# Patient Record
Sex: MALE | Race: WHITE | NOT HISPANIC OR LATINO | URBAN - METROPOLITAN AREA
[De-identification: names, ages, dates, MRNs, and addresses within clinical notes are randomized per-mention and may not be internally consistent; named-entity substitution may affect disease eponyms.]

---

## 2024-02-26 ENCOUNTER — EMERGENCY (EMERGENCY)
Facility: HOSPITAL | Age: 49
LOS: 1 days | Discharge: HOME | End: 2024-02-26
Attending: EMERGENCY MEDICINE
Payer: COMMERCIAL

## 2024-02-26 VITALS
HEART RATE: 105 BPM | TEMPERATURE: 98 F | SYSTOLIC BLOOD PRESSURE: 165 MMHG | OXYGEN SATURATION: 96 % | WEIGHT: 275.58 LBS | DIASTOLIC BLOOD PRESSURE: 103 MMHG | RESPIRATION RATE: 18 BRPM

## 2024-02-26 LAB
ALBUMIN SERPL ELPH-MCNC: 3.9 G/DL — SIGNIFICANT CHANGE UP (ref 3.5–5)
ALP SERPL-CCNC: 69 U/L — SIGNIFICANT CHANGE UP (ref 40–120)
ALT FLD-CCNC: 27 U/L DA — SIGNIFICANT CHANGE UP (ref 10–60)
ANION GAP SERPL CALC-SCNC: 5 MMOL/L — SIGNIFICANT CHANGE UP (ref 5–17)
APPEARANCE UR: CLEAR — SIGNIFICANT CHANGE UP
AST SERPL-CCNC: 14 U/L — SIGNIFICANT CHANGE UP (ref 10–40)
BACTERIA # UR AUTO: NEGATIVE /HPF — SIGNIFICANT CHANGE UP
BASOPHILS # BLD AUTO: 0.04 K/UL — SIGNIFICANT CHANGE UP (ref 0–0.2)
BASOPHILS NFR BLD AUTO: 0.4 % — SIGNIFICANT CHANGE UP (ref 0–2)
BILIRUB SERPL-MCNC: 0.3 MG/DL — SIGNIFICANT CHANGE UP (ref 0.2–1.2)
BILIRUB UR-MCNC: NEGATIVE — SIGNIFICANT CHANGE UP
BUN SERPL-MCNC: 15 MG/DL — SIGNIFICANT CHANGE UP (ref 7–18)
CALCIUM SERPL-MCNC: 8.7 MG/DL — SIGNIFICANT CHANGE UP (ref 8.4–10.5)
CHLORIDE SERPL-SCNC: 104 MMOL/L — SIGNIFICANT CHANGE UP (ref 96–108)
CO2 SERPL-SCNC: 28 MMOL/L — SIGNIFICANT CHANGE UP (ref 22–31)
COLOR SPEC: YELLOW — SIGNIFICANT CHANGE UP
CREAT SERPL-MCNC: 1.15 MG/DL — SIGNIFICANT CHANGE UP (ref 0.5–1.3)
DIFF PNL FLD: ABNORMAL
EGFR: 78 ML/MIN/1.73M2 — SIGNIFICANT CHANGE UP
EOSINOPHIL # BLD AUTO: 0.15 K/UL — SIGNIFICANT CHANGE UP (ref 0–0.5)
EOSINOPHIL NFR BLD AUTO: 1.4 % — SIGNIFICANT CHANGE UP (ref 0–6)
EPI CELLS # UR: PRESENT
GLUCOSE SERPL-MCNC: 102 MG/DL — HIGH (ref 70–99)
GLUCOSE UR QL: NEGATIVE MG/DL — SIGNIFICANT CHANGE UP
HCT VFR BLD CALC: 44 % — SIGNIFICANT CHANGE UP (ref 39–50)
HGB BLD-MCNC: 14.5 G/DL — SIGNIFICANT CHANGE UP (ref 13–17)
IMM GRANULOCYTES NFR BLD AUTO: 0.2 % — SIGNIFICANT CHANGE UP (ref 0–0.9)
KETONES UR-MCNC: NEGATIVE MG/DL — SIGNIFICANT CHANGE UP
LEUKOCYTE ESTERASE UR-ACNC: NEGATIVE — SIGNIFICANT CHANGE UP
LIDOCAIN IGE QN: 48 U/L — SIGNIFICANT CHANGE UP (ref 13–75)
LYMPHOCYTES # BLD AUTO: 2.68 K/UL — SIGNIFICANT CHANGE UP (ref 1–3.3)
LYMPHOCYTES # BLD AUTO: 25.1 % — SIGNIFICANT CHANGE UP (ref 13–44)
MAGNESIUM SERPL-MCNC: 2 MG/DL — SIGNIFICANT CHANGE UP (ref 1.6–2.6)
MCHC RBC-ENTMCNC: 29.3 PG — SIGNIFICANT CHANGE UP (ref 27–34)
MCHC RBC-ENTMCNC: 33 GM/DL — SIGNIFICANT CHANGE UP (ref 32–36)
MCV RBC AUTO: 88.9 FL — SIGNIFICANT CHANGE UP (ref 80–100)
MONOCYTES # BLD AUTO: 1.06 K/UL — HIGH (ref 0–0.9)
MONOCYTES NFR BLD AUTO: 9.9 % — SIGNIFICANT CHANGE UP (ref 2–14)
NEUTROPHILS # BLD AUTO: 6.71 K/UL — SIGNIFICANT CHANGE UP (ref 1.8–7.4)
NEUTROPHILS NFR BLD AUTO: 63 % — SIGNIFICANT CHANGE UP (ref 43–77)
NITRITE UR-MCNC: NEGATIVE — SIGNIFICANT CHANGE UP
NRBC # BLD: 0 /100 WBCS — SIGNIFICANT CHANGE UP (ref 0–0)
PH UR: 5.5 — SIGNIFICANT CHANGE UP (ref 5–8)
PLATELET # BLD AUTO: 264 K/UL — SIGNIFICANT CHANGE UP (ref 150–400)
POTASSIUM SERPL-MCNC: 4.2 MMOL/L — SIGNIFICANT CHANGE UP (ref 3.5–5.3)
POTASSIUM SERPL-SCNC: 4.2 MMOL/L — SIGNIFICANT CHANGE UP (ref 3.5–5.3)
PROT SERPL-MCNC: 8.2 G/DL — SIGNIFICANT CHANGE UP (ref 6–8.3)
PROT UR-MCNC: NEGATIVE MG/DL — SIGNIFICANT CHANGE UP
RBC # BLD: 4.95 M/UL — SIGNIFICANT CHANGE UP (ref 4.2–5.8)
RBC # FLD: 12.1 % — SIGNIFICANT CHANGE UP (ref 10.3–14.5)
RBC CASTS # UR COMP ASSIST: 5 /HPF — HIGH (ref 0–4)
SODIUM SERPL-SCNC: 137 MMOL/L — SIGNIFICANT CHANGE UP (ref 135–145)
SP GR SPEC: 1.01 — SIGNIFICANT CHANGE UP (ref 1–1.03)
UROBILINOGEN FLD QL: 0.2 MG/DL — SIGNIFICANT CHANGE UP (ref 0.2–1)
WBC # BLD: 10.66 K/UL — HIGH (ref 3.8–10.5)
WBC # FLD AUTO: 10.66 K/UL — HIGH (ref 3.8–10.5)
WBC UR QL: 1 /HPF — SIGNIFICANT CHANGE UP (ref 0–5)

## 2024-02-26 PROCEDURE — 83735 ASSAY OF MAGNESIUM: CPT

## 2024-02-26 PROCEDURE — 80053 COMPREHEN METABOLIC PANEL: CPT

## 2024-02-26 PROCEDURE — 36415 COLL VENOUS BLD VENIPUNCTURE: CPT

## 2024-02-26 PROCEDURE — 83690 ASSAY OF LIPASE: CPT

## 2024-02-26 PROCEDURE — 87086 URINE CULTURE/COLONY COUNT: CPT

## 2024-02-26 PROCEDURE — 81001 URINALYSIS AUTO W/SCOPE: CPT

## 2024-02-26 PROCEDURE — 99284 EMERGENCY DEPT VISIT MOD MDM: CPT | Mod: 25

## 2024-02-26 PROCEDURE — 99284 EMERGENCY DEPT VISIT MOD MDM: CPT

## 2024-02-26 PROCEDURE — 74176 CT ABD & PELVIS W/O CONTRAST: CPT | Mod: MC

## 2024-02-26 PROCEDURE — 85025 COMPLETE CBC W/AUTO DIFF WBC: CPT

## 2024-02-26 PROCEDURE — 96361 HYDRATE IV INFUSION ADD-ON: CPT

## 2024-02-26 PROCEDURE — 96374 THER/PROPH/DIAG INJ IV PUSH: CPT

## 2024-02-26 PROCEDURE — 74176 CT ABD & PELVIS W/O CONTRAST: CPT | Mod: 26,MC

## 2024-02-26 RX ORDER — KETOROLAC TROMETHAMINE 30 MG/ML
30 SYRINGE (ML) INJECTION ONCE
Refills: 0 | Status: DISCONTINUED | OUTPATIENT
Start: 2024-02-26 | End: 2024-02-26

## 2024-02-26 RX ORDER — SODIUM CHLORIDE 9 MG/ML
1000 INJECTION INTRAMUSCULAR; INTRAVENOUS; SUBCUTANEOUS
Refills: 0 | Status: DISCONTINUED | OUTPATIENT
Start: 2024-02-26 | End: 2024-03-01

## 2024-02-26 RX ORDER — SODIUM CHLORIDE 9 MG/ML
1000 INJECTION INTRAMUSCULAR; INTRAVENOUS; SUBCUTANEOUS ONCE
Refills: 0 | Status: COMPLETED | OUTPATIENT
Start: 2024-02-26 | End: 2024-02-26

## 2024-02-26 RX ADMIN — Medication 30 MILLIGRAM(S): at 22:53

## 2024-02-26 RX ADMIN — Medication 30 MILLIGRAM(S): at 22:50

## 2024-02-26 RX ADMIN — SODIUM CHLORIDE 1000 MILLILITER(S): 9 INJECTION INTRAMUSCULAR; INTRAVENOUS; SUBCUTANEOUS at 20:49

## 2024-02-26 RX ADMIN — SODIUM CHLORIDE 150 MILLILITER(S): 9 INJECTION INTRAMUSCULAR; INTRAVENOUS; SUBCUTANEOUS at 22:49

## 2024-02-27 VITALS
SYSTOLIC BLOOD PRESSURE: 158 MMHG | HEART RATE: 71 BPM | OXYGEN SATURATION: 100 % | DIASTOLIC BLOOD PRESSURE: 95 MMHG | RESPIRATION RATE: 18 BRPM

## 2024-02-27 RX ORDER — IBUPROFEN 200 MG
1 TABLET ORAL
Qty: 21 | Refills: 0
Start: 2024-02-27 | End: 2024-03-04

## 2024-02-27 RX ORDER — TAMSULOSIN HYDROCHLORIDE 0.4 MG/1
1 CAPSULE ORAL
Qty: 14 | Refills: 0
Start: 2024-02-27 | End: 2024-03-11

## 2024-02-27 RX ADMIN — SODIUM CHLORIDE 1000 MILLILITER(S): 9 INJECTION INTRAMUSCULAR; INTRAVENOUS; SUBCUTANEOUS at 00:23

## 2024-02-27 NOTE — ED PROVIDER NOTE - CLINICAL SUMMARY MEDICAL DECISION MAKING FREE TEXT BOX
Patient with history of renal colic.  Now with sudden onset of left flank pain radiates to his left lower quadrant, concern for acute renal colic, unlikely patient with pyelonephritis or diverticulitis.  Will get CT, labs, give IV fluids and Toradol and reassess

## 2024-02-27 NOTE — ED PROVIDER NOTE - OBJECTIVE STATEMENT
48-year-old male with history of renal colic, cholecystectomy, complaining on and off left sided flank pain radiates to his left lower quadrant pain today.  Patient denies N/V, dysuria, hematuria, fever.  Pain is worse with movement.  He took nothing for his pain.  Last BM was today while having his pain

## 2024-02-27 NOTE — ED PROVIDER NOTE - NSFOLLOWUPINSTRUCTIONS_ED_ALL_ED_FT
Renal Colic  The urinary tract with a close-up of a kidney with kidney stones.  Renal colic is pain that is caused by passing a kidney stone. The pain can be sharp and severe. It may be felt in your back, abdomen, side (flank), or groin. It can cause nausea. Renal colic can come and go.    Follow these instructions at home:  Watch your condition for any changes.    Medicines    Take over-the-counter and prescription medicines only as told by your health care provider.  Ask your provider if the medicine prescribed to you:  Requires you to avoid driving or using machinery.  Can cause constipation. You may need to take these actions to prevent or treat constipation:  Take over-the-counter or prescription medicines.  Eat foods that are high in fiber, such as beans, whole grains, and fresh fruits and vegetables.  Limit foods that are high in fat and processed sugars, such as fried or sweet foods.  Eating and drinking    Drink enough fluid to keep your pee (urine) pale yellow. You may be told to drink at least 8–10 glasses of water each day.  Follow instructions from your provider about what you may eat and drink.  If told, change your diet. You may need to:  Limit how much salt (sodium) you eat. You may need to eat less than 2 grams (2,000 mg) per day.  Eat more fruits and vegetables.  Limit how much animal protein you eat. This includes red meat, fish, poultry, and eggs.  Avoid foods such as spinach, rhubarb, sweet potatoes, and nuts. These foods make kidney stones more likely to form.  General instructions    Collect pee samples as told by your provider. You may need to collect a pee sample after you pass the kidney stone.  Strain your pee every time you pee (urinate), for as long as you are told. Use the strainer that your provider gives you.  Do not throw out the kidney stone after you pass it. Keep the stone so it can be tested by your provider. Testing the makeup of your kidney stone may show why you got it and help prevent you from getting more in the future.  Your provider may give you more instructions. Make sure you know what you can and cannot do.    Contact a health care provider if:  You have a fever or chills.  Your pee smells bad or looks cloudy.  You have pain or burning when you pee.  You have blood in your pee.  Get help right away if:  The pain in your flank or groin suddenly gets worse.  You become confused or do not know the time of day, where you are, or who you are (become disoriented).  You feel like you may faint or you faint.  This information is not intended to replace advice given to you by your health care provider. Make sure you discuss any questions you have with your health care provider.      Use a strainer to catch the stone  Drink plenty of fluids  Take Flomax at nighttime  Take ibuprofen 3 times a day as needed with food  You will receive a phone call regarding urology referral  Have urology follow-up with your abnormal kidney finding on your right side

## 2024-02-27 NOTE — ED PROVIDER NOTE - PROGRESS NOTE DETAILS
Labs/CT explained to patient and wife  Patient is feeling much better, denies any abdominal pain.  Patient with left renal colic.  Also explained to patient that he has a possible right renal cyst.  Will give outpatient urology referral.  Will D/C home with Flomax and ibuprofen

## 2024-02-27 NOTE — ED PROVIDER NOTE - PATIENT PORTAL LINK FT
You can access the FollowMyHealth Patient Portal offered by [facility] by registering at the following website: [email or web address]. By joining [employer]’s FollowMyHealth portal, you will also be able to view your health information using other applications (apps) compatible with our system.

## 2024-02-28 LAB
CULTURE RESULTS: SIGNIFICANT CHANGE UP
SPECIMEN SOURCE: SIGNIFICANT CHANGE UP

## 2024-03-04 ENCOUNTER — APPOINTMENT (OUTPATIENT)
Dept: UROLOGY | Facility: CLINIC | Age: 49
End: 2024-03-04
Payer: COMMERCIAL

## 2024-03-04 ENCOUNTER — RESULT REVIEW (OUTPATIENT)
Age: 49
End: 2024-03-04

## 2024-03-04 VITALS
BODY MASS INDEX: 29.63 KG/M2 | HEART RATE: 81 BPM | OXYGEN SATURATION: 95 % | DIASTOLIC BLOOD PRESSURE: 104 MMHG | SYSTOLIC BLOOD PRESSURE: 165 MMHG | WEIGHT: 207 LBS | TEMPERATURE: 96.3 F | RESPIRATION RATE: 16 BRPM | HEIGHT: 70 IN

## 2024-03-04 DIAGNOSIS — N28.1 CYST OF KIDNEY, ACQUIRED: ICD-10-CM

## 2024-03-04 PROBLEM — Z00.00 ENCOUNTER FOR PREVENTIVE HEALTH EXAMINATION: Status: ACTIVE | Noted: 2024-03-04

## 2024-03-04 PROCEDURE — G2211 COMPLEX E/M VISIT ADD ON: CPT

## 2024-03-04 PROCEDURE — 99204 OFFICE O/P NEW MOD 45 MIN: CPT

## 2024-03-04 NOTE — HISTORY OF PRESENT ILLNESS
[FreeTextEntry1] : 48-year-old male seen in the ED February 28, 2024 with left flank pain found to have a 3 mm left UVJ stone.  He currently has no  symptoms. He was started on tamsulosin for MET and has passed his stone. He brought it in today  Also found on CT to have an indeterminate right renal cyst on non con CT.

## 2024-03-04 NOTE — PHYSICAL EXAM
[Normal Appearance] : normal appearance [Well Groomed] : well groomed [General Appearance - In No Acute Distress] : no acute distress [Edema] : no peripheral edema [Respiration, Rhythm And Depth] : normal respiratory rhythm and effort [Abdomen Soft] : soft [Exaggerated Use Of Accessory Muscles For Inspiration] : no accessory muscle use [Abdomen Tenderness] : non-tender [Costovertebral Angle Tenderness] : no ~M costovertebral angle tenderness [Normal Station and Gait] : the gait and station were normal for the patient's age [Urinary Bladder Findings] : the bladder was normal on palpation [No Focal Deficits] : no focal deficits [] : no rash [Oriented To Time, Place, And Person] : oriented to person, place, and time [Mood] : the mood was normal [Affect] : the affect was normal [No Palpable Adenopathy] : no palpable adenopathy

## 2024-03-07 LAB
KIDNEY STONE SOURCE: NORMAL
NIDUS STONE QN: NORMAL
RESULT COMMENT: NORMAL

## 2024-03-11 ENCOUNTER — APPOINTMENT (OUTPATIENT)
Dept: CT IMAGING | Facility: CLINIC | Age: 49
End: 2024-03-11
Payer: COMMERCIAL

## 2024-03-11 PROCEDURE — 74178 CT ABD&PLV WO CNTR FLWD CNTR: CPT

## 2024-04-01 ENCOUNTER — APPOINTMENT (OUTPATIENT)
Dept: UROLOGY | Facility: CLINIC | Age: 49
End: 2024-04-01
Payer: COMMERCIAL

## 2024-04-01 ENCOUNTER — TRANSCRIPTION ENCOUNTER (OUTPATIENT)
Age: 49
End: 2024-04-01

## 2024-04-01 VITALS
DIASTOLIC BLOOD PRESSURE: 116 MMHG | BODY MASS INDEX: 39.08 KG/M2 | HEART RATE: 91 BPM | TEMPERATURE: 98 F | HEIGHT: 70 IN | OXYGEN SATURATION: 98 % | WEIGHT: 273 LBS | SYSTOLIC BLOOD PRESSURE: 164 MMHG

## 2024-04-01 DIAGNOSIS — N20.0 CALCULUS OF KIDNEY: ICD-10-CM

## 2024-04-01 PROCEDURE — 99214 OFFICE O/P EST MOD 30 MIN: CPT

## 2024-04-01 PROCEDURE — G2211 COMPLEX E/M VISIT ADD ON: CPT

## 2024-04-01 NOTE — HISTORY OF PRESENT ILLNESS
[FreeTextEntry1] : Welsh  #[id number]  48-year-old male seen in the ED February 28, 2024 with left flank pain found to have a 3 mm left UVJ stone. His CT non con demonstrated a 2.4 cm indeterminate mass. He underwent repeat CT abd/pelvis with and without iv contrast and is here today to discuss the findings.  His CT was significant for 2.4 cm hypoenhancing mass with nonenhancing central scar.

## 2024-04-01 NOTE — ASSESSMENT
[FreeTextEntry1] : 48-year-old male with a 3 mm left UVJ stone which he has passed. Stone analysis discussed and reviewed dietary modifications for stone prevention  We also reviewed his CT abd/pelvis demonstrating a small renal mass. I discussed with him options including active surveillance vs partial nephrectomy. We discussed reimaging intervals to look for growth kinetics. He will repeat imaging in 4 months to better assess growth kinetics and renal mass characteristics.    Plan Reviewed stone analysis results: Calcium oxalate I have reviewed images of patient's ct abd/pelvis and discussed results with patient demonstrating a 2.4 cm hypoenhancing right renal mass that is most likely oncocytoma but unable to rule out RCC Renal US in 4 months to assess for stones as well as renal mass size Repeat CT abd/pelvis in 4 months to better assess renal mass growth kinetics Dietary modifications for stone prevention discused    Patient is being seen today for evaluation and management of a chronic and longitudinal ongoing condition and I am of the primary treating physician.

## 2024-04-01 NOTE — PHYSICAL EXAM
[Normal Appearance] : normal appearance [Well Groomed] : well groomed [Edema] : no peripheral edema [General Appearance - In No Acute Distress] : no acute distress [Abdomen Soft] : soft [Exaggerated Use Of Accessory Muscles For Inspiration] : no accessory muscle use [Respiration, Rhythm And Depth] : normal respiratory rhythm and effort [Costovertebral Angle Tenderness] : no ~M costovertebral angle tenderness [Abdomen Tenderness] : non-tender [Urinary Bladder Findings] : the bladder was normal on palpation [Normal Station and Gait] : the gait and station were normal for the patient's age [] : no rash [Oriented To Time, Place, And Person] : oriented to person, place, and time [No Focal Deficits] : no focal deficits [Affect] : the affect was normal [No Palpable Adenopathy] : no palpable adenopathy [Mood] : the mood was normal

## 2024-04-08 ENCOUNTER — APPOINTMENT (OUTPATIENT)
Dept: UROLOGY | Facility: CLINIC | Age: 49
End: 2024-04-08
Payer: COMMERCIAL

## 2024-04-08 VITALS
OXYGEN SATURATION: 96 % | SYSTOLIC BLOOD PRESSURE: 168 MMHG | TEMPERATURE: 98.1 F | HEART RATE: 87 BPM | BODY MASS INDEX: 39.08 KG/M2 | WEIGHT: 273 LBS | DIASTOLIC BLOOD PRESSURE: 103 MMHG | HEIGHT: 70 IN | RESPIRATION RATE: 16 BRPM

## 2024-04-08 DIAGNOSIS — N28.89 OTHER SPECIFIED DISORDERS OF KIDNEY AND URETER: ICD-10-CM

## 2024-04-08 PROCEDURE — 99215 OFFICE O/P EST HI 40 MIN: CPT

## 2024-04-08 NOTE — HISTORY OF PRESENT ILLNESS
[FreeTextEntry1] : AN REED Nov 30 1975   Language: English / Lao refused office  requested daughter-in law discuss.  Date of First visit: 04/08/2024 Accompanied by: self - daughter in law on telephone Marylin Contact info:  Dr. Rajput - second opinion primary care -[phone] Referring Provider/PCP: Dr. Darcie Draper Fax: [phone]   CC/ Problem List: Right renal mass =============================================================================== FIRST VISIT / Summary: Very pleasant 48 year old M here for discussion of renal mass found on CT. He is nervous about the mass and saw Dr. Rajput to discuss in his own language options. Had long discussion about options comparing and contrasting including Sestamibi PET surveillance vs conventional, biopsy, ablation, or surgery. He is quite nervous about the mass and is young.    When he checks BP at home is about 130/85.  ------------------------------------------------------------------------------------------- INTERVAL VISITS:   =============================================================================   PMH: HTN Meds: started a medicine with Dr. Rajput All: nkda FHx: No  malignancies. SocHx:    PSH: Cholecystectomy ~2012  ROS: Review of Systems is as per HPI unless otherwise denoted below  ============================================================================= DATA: LABS (SELECTED):---------------------------------------------------------------------------------------------------     RADS:------------------------------------------------------------------------------------------------------------------- 3/11/24: CT abd/pelvis with/without: right renal mass, significant peripheral enhancement from 57 to 177 HU. Central area hypoenhancing may represent scar or necrosis.    PATHOLOGY/CYTOLOGY:-------------------------------------------------------------------------------------------     VOIDING STUDIES: ----------------------------------------------------------------------------------------------------    STONE STUDIES: ([facility]/LLSA)---------------------------------------------------------------------------------     PROCEDURES: -----------------------------------------------------------------------------------------------       ============================================================================= PHYSICAL EXAM:    FOCUSED: ----------------------------------------------------------------------------------------------------------------     ======================================================================================= DISCUSSION: The patient and I talked about the nature, diagnosis and treatment of renal masses. We discussed that the majority are malignant (about 80%) and the remainder may be benign (e.g. oncocytoma, angiomyelolipoma). Small masses have a higher chance of being benign, and as the size increases or the growth rate increases, the risk of malignancy increases. The patient understands that percutaneous biopsies are not routinely used to differentiate between masses due to the risk of error or false negative result as some tumors can have benign and malignant components. We discussed 4 options for management:   1: Active Surveillance: this approach follows the kidney mass every 6-12 months initially and less often as time goes on. Use of ultrasound reduces radiation dose over time and is therefore used for follow-up after growth rate has been established. The benefit of this approach is it avoids any morbidity of the other options. Also Sestamibi scan may characterize a renal mass. His mass may have central scar however he is uninterested in surveillance.   2: Renal Mass Biopsy: As mentioned above, this is not always diagnostic and small or central tumors may be more difficult to target. The aim of this approach is to confirm that a mass is indeed malignant before any surgery is performed. By watching those with benign findings, it is possible to decrease the number of patients undergoing surgery. There are rare case reports of tumor spillage or seeding of a needle tract however this is exceedingly rare.   3: Partial or Radical Nephrectomy: Surgical excision of a tumor is the most definitive treatment, and whenever advisable we perform nephron sparing surgery (partial nephrectomy). In addition to treating the tumor, this method provides the most accurate staging as the entire tumor is examined under the microscope. This is often the only treatment that is needed for renal cell carcinomas (select more advanced cases may also benefit from immunotherapy).   4: Ablation: with this technique a tumor can be destroyed by using energy (generally cold or heat) transmitted by needles placed through the skin. A biopsy of the mass is generally also performed at the same time. This method has decreased efficacy in masses greater than 3-4cm and is best for tumors in certain locations on the kidney where it is safe to apply the energy. This approach avoids surgery, but does have a higher risk of local recurrence (tumor regrowth in the same location). If local recurrence is found, this can be treated with either surgery or repeat ablation.   There are certainly risks to all procedures including bleeding, infection and damage to surrounding organs. This includes muscles, nerves, spleen, liver, pancreas, intestines, arteries, veins, and risk of medical complications including infection, DVT, PE, MI, anaesthetic related complications including death. There is also a risk of urine leak, particularly in nephron sparing procedures. Some complications may not be recognized during the procedure such as delayed thermal injury or retraction injury. Risk of KATHYA or unmasking of existing chronic kidney disease as well as surgical CKD and risk of ESRD were reviewed. Surgery is the standard of care for localized renal lesions. In general chemotherapy or radiation are only given if metastatic lesions are present that cannot be surgically removed or for palliation. Oncologic outcomes were discussed and compared between radical nephrectomy and nephron sparing surgery, and open vs minimally invasive surgery.    Based on the above conversation, I recommended any of the above The patient opted for definitive treatment, he is worried and thinking of this a lot ======================================================================================= ASSESSMENT and PLAN   1. Right Renal Mass - he is uncomfortable with observation/active surveillance and would rather have partial nephrectomy - biopsy discussed but he prefers one procedure - plan for mid May - labs today - PCP clearance for surgery   =======================================================================================  5 The patient's imaging study prior to this visit was personally reviewed and the above is my independent interpretation for the  organ systems.  Thank you for allowing me to assist in the care of your patient. Should you have any questions please do not hesitate to reach out to me.     Garrett Junior MD                                                         [facility] Physician [facility]   [place] Office: [address], [address] [place] [address] T: [phone] F: [phone]   [place] Office: [address] [place] [address], 1st [place] [address] T: [phone] F: [phone]

## 2024-04-09 LAB
ABO + RH PNL BLD: NORMAL
ALBUMIN SERPL ELPH-MCNC: 4.8 G/DL
ALP BLD-CCNC: 79 U/L
ALT SERPL-CCNC: 21 U/L
ANION GAP SERPL CALC-SCNC: 12 MMOL/L
AST SERPL-CCNC: 20 U/L
BASOPHILS # BLD AUTO: 0.04 K/UL
BASOPHILS NFR BLD AUTO: 0.6 %
BILIRUB SERPL-MCNC: 0.3 MG/DL
BUN SERPL-MCNC: 11 MG/DL
CALCIUM SERPL-MCNC: 9.5 MG/DL
CHLORIDE SERPL-SCNC: 102 MMOL/L
CO2 SERPL-SCNC: 25 MMOL/L
CREAT SERPL-MCNC: 0.82 MG/DL
EGFR: 108 ML/MIN/1.73M2
EOSINOPHIL # BLD AUTO: 0.15 K/UL
EOSINOPHIL NFR BLD AUTO: 2.2 %
ESTIMATED AVERAGE GLUCOSE: 120 MG/DL
GLUCOSE SERPL-MCNC: 95 MG/DL
HBA1C MFR BLD HPLC: 5.8 %
HCT VFR BLD CALC: 44.4 %
HGB BLD-MCNC: 14.4 G/DL
IMM GRANULOCYTES NFR BLD AUTO: 0.3 %
INR PPP: 0.94 RATIO
LYMPHOCYTES # BLD AUTO: 2.08 K/UL
LYMPHOCYTES NFR BLD AUTO: 31 %
MAN DIFF?: NORMAL
MCHC RBC-ENTMCNC: 28.9 PG
MCHC RBC-ENTMCNC: 32.4 GM/DL
MCV RBC AUTO: 89.2 FL
MONOCYTES # BLD AUTO: 0.7 K/UL
MONOCYTES NFR BLD AUTO: 10.4 %
NEUTROPHILS # BLD AUTO: 3.73 K/UL
NEUTROPHILS NFR BLD AUTO: 55.5 %
PLATELET # BLD AUTO: 264 K/UL
POTASSIUM SERPL-SCNC: 4.5 MMOL/L
PROT SERPL-MCNC: 7.9 G/DL
PT BLD: 10.6 SEC
RBC # BLD: 4.98 M/UL
RBC # FLD: 13.1 %
SODIUM SERPL-SCNC: 140 MMOL/L
WBC # FLD AUTO: 6.72 K/UL

## 2024-04-10 LAB — BACTERIA UR CULT: NORMAL

## 2024-05-20 LAB — BACTERIA UR CULT: NORMAL

## 2024-05-21 ENCOUNTER — TRANSCRIPTION ENCOUNTER (OUTPATIENT)
Age: 49
End: 2024-05-21

## 2024-05-21 VITALS
TEMPERATURE: 98 F | OXYGEN SATURATION: 96 % | WEIGHT: 279.33 LBS | HEIGHT: 70 IN | SYSTOLIC BLOOD PRESSURE: 128 MMHG | RESPIRATION RATE: 16 BRPM | DIASTOLIC BLOOD PRESSURE: 82 MMHG | HEART RATE: 97 BPM

## 2024-05-21 NOTE — ASU PATIENT PROFILE, ADULT - NSICDXPASTMEDICALHX_GEN_ALL_CORE_FT
PAST MEDICAL HISTORY:  DM (diabetes mellitus) PRE    H/O vitamin D deficiency     HTN (hypertension)

## 2024-05-22 ENCOUNTER — RESULT REVIEW (OUTPATIENT)
Age: 49
End: 2024-05-22

## 2024-05-22 ENCOUNTER — INPATIENT (INPATIENT)
Facility: HOSPITAL | Age: 49
LOS: 0 days | Discharge: HOME | End: 2024-05-23
Attending: STUDENT IN AN ORGANIZED HEALTH CARE EDUCATION/TRAINING PROGRAM | Admitting: STUDENT IN AN ORGANIZED HEALTH CARE EDUCATION/TRAINING PROGRAM
Payer: COMMERCIAL

## 2024-05-22 ENCOUNTER — NON-APPOINTMENT (OUTPATIENT)
Age: 49
End: 2024-05-22

## 2024-05-22 ENCOUNTER — APPOINTMENT (OUTPATIENT)
Dept: UROLOGY | Facility: HOSPITAL | Age: 49
End: 2024-05-22

## 2024-05-22 DIAGNOSIS — Z90.49 ACQUIRED ABSENCE OF OTHER SPECIFIED PARTS OF DIGESTIVE TRACT: Chronic | ICD-10-CM

## 2024-05-22 DIAGNOSIS — N28.89 OTHER SPECIFIED DISORDERS OF KIDNEY AND URETER: ICD-10-CM

## 2024-05-22 LAB
BASE EXCESS BLDA CALC-SCNC: -0.6 MMOL/L — SIGNIFICANT CHANGE UP (ref -2–3)
BASE EXCESS BLDA CALC-SCNC: -1.7 MMOL/L — SIGNIFICANT CHANGE UP (ref -2–3)
BLD GP AB SCN SERPL QL: NEGATIVE — SIGNIFICANT CHANGE UP
CA-I BLDA-SCNC: 1.15 MMOL/L — SIGNIFICANT CHANGE UP (ref 1.15–1.33)
CA-I BLDA-SCNC: 1.17 MMOL/L — SIGNIFICANT CHANGE UP (ref 1.15–1.33)
CO2 BLDA-SCNC: 26 MMOL/L — HIGH (ref 19–24)
CO2 BLDA-SCNC: 26 MMOL/L — HIGH (ref 19–24)
COHGB MFR BLDA: 0.6 % — SIGNIFICANT CHANGE UP
COHGB MFR BLDA: 1.4 % — SIGNIFICANT CHANGE UP
GLUCOSE BLDA-MCNC: 114 MG/DL — HIGH (ref 70–99)
GLUCOSE BLDA-MCNC: 139 MG/DL — HIGH (ref 70–99)
GLUCOSE BLDC GLUCOMTR-MCNC: 123 MG/DL — HIGH (ref 70–99)
HCO3 BLDA-SCNC: 24 MMOL/L — SIGNIFICANT CHANGE UP (ref 21–28)
HCO3 BLDA-SCNC: 25 MMOL/L — SIGNIFICANT CHANGE UP (ref 21–28)
HGB BLDA-MCNC: 12.7 G/DL — SIGNIFICANT CHANGE UP (ref 12.6–17.4)
HGB BLDA-MCNC: 12.9 G/DL — SIGNIFICANT CHANGE UP (ref 12.6–17.4)
METHGB MFR BLDA: 0.4 % — SIGNIFICANT CHANGE UP
METHGB MFR BLDA: 0.6 % — SIGNIFICANT CHANGE UP
OXYHGB MFR BLDA: 96.7 % — HIGH (ref 90–95)
OXYHGB MFR BLDA: 97.9 % — HIGH (ref 90–95)
PCO2 BLDA: 43 MMHG — SIGNIFICANT CHANGE UP (ref 35–48)
PCO2 BLDA: 45 MMHG — SIGNIFICANT CHANGE UP (ref 35–48)
PH BLDA: 7.34 — LOW (ref 7.35–7.45)
PH BLDA: 7.37 — SIGNIFICANT CHANGE UP (ref 7.35–7.45)
PO2 BLDA: 172 MMHG — HIGH (ref 83–108)
PO2 BLDA: 96 MMHG — SIGNIFICANT CHANGE UP (ref 83–108)
POTASSIUM BLDA-SCNC: 4.3 MMOL/L — SIGNIFICANT CHANGE UP (ref 3.5–5.1)
POTASSIUM BLDA-SCNC: 5 MMOL/L — SIGNIFICANT CHANGE UP (ref 3.5–5.1)
RH IG SCN BLD-IMP: POSITIVE — SIGNIFICANT CHANGE UP
SAO2 % BLDA: 98.5 % — HIGH (ref 94–98)
SAO2 % BLDA: 99.1 % — HIGH (ref 94–98)
SODIUM BLDA-SCNC: 134 MMOL/L — LOW (ref 136–145)
SODIUM BLDA-SCNC: 136 MMOL/L — SIGNIFICANT CHANGE UP (ref 136–145)

## 2024-05-22 PROCEDURE — 50543 LAPARO PARTIAL NEPHRECTOMY: CPT | Mod: AS,RT

## 2024-05-22 PROCEDURE — S2900 ROBOTIC SURGICAL SYSTEM: CPT

## 2024-05-22 PROCEDURE — 88305 TISSUE EXAM BY PATHOLOGIST: CPT | Mod: 26

## 2024-05-22 PROCEDURE — 88307 TISSUE EXAM BY PATHOLOGIST: CPT | Mod: 26

## 2024-05-22 PROCEDURE — 50543 LAPARO PARTIAL NEPHRECTOMY: CPT | Mod: RT

## 2024-05-22 DEVICE — LIGATING CLIPS WECK HEMOLOK POLYMER LARGE (PURPLE) 6: Type: IMPLANTABLE DEVICE | Status: FUNCTIONAL

## 2024-05-22 DEVICE — SURGICEL FIBRILLAR 4 X 4": Type: IMPLANTABLE DEVICE | Status: FUNCTIONAL

## 2024-05-22 RX ORDER — ONDANSETRON 8 MG/1
4 TABLET, FILM COATED ORAL EVERY 6 HOURS
Refills: 0 | Status: DISCONTINUED | OUTPATIENT
Start: 2024-05-22 | End: 2024-05-23

## 2024-05-22 RX ORDER — HEPARIN SODIUM 5000 [USP'U]/ML
5000 INJECTION INTRAVENOUS; SUBCUTANEOUS ONCE
Refills: 0 | Status: COMPLETED | OUTPATIENT
Start: 2024-05-22 | End: 2024-05-22

## 2024-05-22 RX ORDER — ACETAMINOPHEN 500 MG
1000 TABLET ORAL ONCE
Refills: 0 | Status: COMPLETED | OUTPATIENT
Start: 2024-05-22 | End: 2024-05-22

## 2024-05-22 RX ORDER — SODIUM CHLORIDE 9 MG/ML
1000 INJECTION INTRAMUSCULAR; INTRAVENOUS; SUBCUTANEOUS
Refills: 0 | Status: DISCONTINUED | OUTPATIENT
Start: 2024-05-22 | End: 2024-05-23

## 2024-05-22 RX ORDER — ENOXAPARIN SODIUM 100 MG/ML
40 INJECTION SUBCUTANEOUS EVERY 12 HOURS
Refills: 0 | Status: DISCONTINUED | OUTPATIENT
Start: 2024-05-23 | End: 2024-05-23

## 2024-05-22 RX ORDER — AMLODIPINE AND VALSARTAN 5; 320 MG/1; MG/1
1 TABLET, FILM COATED ORAL
Refills: 0 | DISCHARGE

## 2024-05-22 RX ORDER — ACETAMINOPHEN 500 MG
975 TABLET ORAL EVERY 6 HOURS
Refills: 0 | Status: DISCONTINUED | OUTPATIENT
Start: 2024-05-22 | End: 2024-05-23

## 2024-05-22 RX ORDER — CEFAZOLIN SODIUM 1 G
2000 VIAL (EA) INJECTION EVERY 8 HOURS
Refills: 0 | Status: DISCONTINUED | OUTPATIENT
Start: 2024-05-22 | End: 2024-05-22

## 2024-05-22 RX ORDER — OXYCODONE HYDROCHLORIDE 5 MG/1
5 TABLET ORAL EVERY 6 HOURS
Refills: 0 | Status: DISCONTINUED | OUTPATIENT
Start: 2024-05-22 | End: 2024-05-23

## 2024-05-22 RX ORDER — METOCLOPRAMIDE HCL 10 MG
10 TABLET ORAL ONCE
Refills: 0 | Status: COMPLETED | OUTPATIENT
Start: 2024-05-22 | End: 2024-05-22

## 2024-05-22 RX ORDER — LIDOCAINE 4 G/100G
1 CREAM TOPICAL EVERY 24 HOURS
Refills: 0 | Status: DISCONTINUED | OUTPATIENT
Start: 2024-05-22 | End: 2024-05-23

## 2024-05-22 RX ORDER — HYDROMORPHONE HYDROCHLORIDE 2 MG/ML
0.5 INJECTION INTRAMUSCULAR; INTRAVENOUS; SUBCUTANEOUS ONCE
Refills: 0 | Status: DISCONTINUED | OUTPATIENT
Start: 2024-05-22 | End: 2024-05-23

## 2024-05-22 RX ADMIN — ONDANSETRON 4 MILLIGRAM(S): 8 TABLET, FILM COATED ORAL at 15:00

## 2024-05-22 RX ADMIN — SODIUM CHLORIDE 150 MILLILITER(S): 9 INJECTION INTRAMUSCULAR; INTRAVENOUS; SUBCUTANEOUS at 15:01

## 2024-05-22 RX ADMIN — LIDOCAINE 1 PATCH: 4 CREAM TOPICAL at 16:46

## 2024-05-22 RX ADMIN — HEPARIN SODIUM 5000 UNIT(S): 5000 INJECTION INTRAVENOUS; SUBCUTANEOUS at 06:52

## 2024-05-22 RX ADMIN — LIDOCAINE 1 PATCH: 4 CREAM TOPICAL at 18:48

## 2024-05-22 RX ADMIN — SODIUM CHLORIDE 150 MILLILITER(S): 9 INJECTION INTRAMUSCULAR; INTRAVENOUS; SUBCUTANEOUS at 23:57

## 2024-05-22 RX ADMIN — Medication 1000 MILLIGRAM(S): at 06:52

## 2024-05-22 RX ADMIN — Medication 10 MILLIGRAM(S): at 17:03

## 2024-05-22 NOTE — PRE-ANESTHESIA EVALUATION ADULT - NSANTHPEFT_GEN_ALL_CORE
No Appearance consistent with chronological age  Moist mucus membranes  Heart and lung exam unremarkable  Awake and alert, moves all extremities

## 2024-05-22 NOTE — PRE-ANESTHESIA EVALUATION ADULT - NSANTHADDINFOFT_GEN_ALL_CORE
Discussed the plan for general anesthesia and explained associated risks and benefits - including risk of cardiopulmonary compromise, eye injury, sore throat, airway injury, postoperative nausea and vomiting, and positioning or nerve injury. Patient safety was emphasized, all questions were answered, and the patient elects to proceed.     Risks of arterial line explained to patient including risk of infection, blood loss, damage to surrounding structures and hematoma formation, patient is in agreement for arterial line placement

## 2024-05-22 NOTE — PRE-ANESTHESIA EVALUATION ADULT - NSANTHPMHFT_GEN_ALL_CORE
I have a steady place to live 47yo M s/p cholecystectomy with PMH also s/f pre-DM and HTN on amlodipine-valsartan combination pill (last dose 5/21 AM) presenting for right multiport robotic assisted laparoscopic partial nephrectomy.

## 2024-05-22 NOTE — H&P ADULT - HISTORY OF PRESENT ILLNESS
CC/ Problem List:  Right renal mass  ===============================================================================  FIRST VISIT / Summary:  Very pleasant 48 year old M here for discussion of renal mass found on CT. He is nervous about the mass and saw Dr. Rajput to discuss in his own language options. Had long discussion about options comparing and contrasting including Sestamibi PET surveillance vs conventional, biopsy, ablation, or surgery. He is quite nervous about the mass and is young.     When he checks BP at home is about 130/85.   -------------------------------------------------------------------------------------------  INTERVAL VISITS:    =============================================================================    PMH: HTN  Meds: started a medicine with Dr. Rajput  All: nkda  FHx: No  malignancies.  SocHx:     PSH: Cholecystectomy ~2012

## 2024-05-23 ENCOUNTER — TRANSCRIPTION ENCOUNTER (OUTPATIENT)
Age: 49
End: 2024-05-23

## 2024-05-23 VITALS
DIASTOLIC BLOOD PRESSURE: 84 MMHG | TEMPERATURE: 99 F | SYSTOLIC BLOOD PRESSURE: 145 MMHG | OXYGEN SATURATION: 94 % | HEART RATE: 106 BPM | RESPIRATION RATE: 17 BRPM

## 2024-05-23 LAB
ANION GAP SERPL CALC-SCNC: 10 MMOL/L — SIGNIFICANT CHANGE UP (ref 5–17)
BASOPHILS # BLD AUTO: 0 K/UL — SIGNIFICANT CHANGE UP (ref 0–0.2)
BASOPHILS NFR BLD AUTO: 0 % — SIGNIFICANT CHANGE UP (ref 0–2)
BUN SERPL-MCNC: 16 MG/DL — SIGNIFICANT CHANGE UP (ref 7–23)
CALCIUM SERPL-MCNC: 8.3 MG/DL — LOW (ref 8.4–10.5)
CHLORIDE SERPL-SCNC: 106 MMOL/L — SIGNIFICANT CHANGE UP (ref 96–108)
CO2 SERPL-SCNC: 22 MMOL/L — SIGNIFICANT CHANGE UP (ref 22–31)
CREAT SERPL-MCNC: 1.08 MG/DL — SIGNIFICANT CHANGE UP (ref 0.5–1.3)
EGFR: 85 ML/MIN/1.73M2 — SIGNIFICANT CHANGE UP
EOSINOPHIL # BLD AUTO: 0 K/UL — SIGNIFICANT CHANGE UP (ref 0–0.5)
EOSINOPHIL NFR BLD AUTO: 0 % — SIGNIFICANT CHANGE UP (ref 0–6)
GLUCOSE SERPL-MCNC: 114 MG/DL — HIGH (ref 70–99)
HCT VFR BLD CALC: 37 % — LOW (ref 39–50)
HGB BLD-MCNC: 12.3 G/DL — LOW (ref 13–17)
LYMPHOCYTES # BLD AUTO: 1.75 K/UL — SIGNIFICANT CHANGE UP (ref 1–3.3)
LYMPHOCYTES # BLD AUTO: 13.9 % — SIGNIFICANT CHANGE UP (ref 13–44)
MANUAL SMEAR VERIFICATION: SIGNIFICANT CHANGE UP
MCHC RBC-ENTMCNC: 30.2 PG — SIGNIFICANT CHANGE UP (ref 27–34)
MCHC RBC-ENTMCNC: 33.2 GM/DL — SIGNIFICANT CHANGE UP (ref 32–36)
MCV RBC AUTO: 90.9 FL — SIGNIFICANT CHANGE UP (ref 80–100)
MONOCYTES # BLD AUTO: 0.88 K/UL — SIGNIFICANT CHANGE UP (ref 0–0.9)
MONOCYTES NFR BLD AUTO: 7 % — SIGNIFICANT CHANGE UP (ref 2–14)
NEUTROPHILS # BLD AUTO: 9.98 K/UL — HIGH (ref 1.8–7.4)
NEUTROPHILS NFR BLD AUTO: 79.1 % — HIGH (ref 43–77)
PLAT MORPH BLD: NORMAL — SIGNIFICANT CHANGE UP
PLATELET # BLD AUTO: 227 K/UL — SIGNIFICANT CHANGE UP (ref 150–400)
POIKILOCYTOSIS BLD QL AUTO: SLIGHT — SIGNIFICANT CHANGE UP
POLYCHROMASIA BLD QL SMEAR: SLIGHT — SIGNIFICANT CHANGE UP
POTASSIUM SERPL-MCNC: 4.1 MMOL/L — SIGNIFICANT CHANGE UP (ref 3.5–5.3)
POTASSIUM SERPL-SCNC: 4.1 MMOL/L — SIGNIFICANT CHANGE UP (ref 3.5–5.3)
RBC # BLD: 4.07 M/UL — LOW (ref 4.2–5.8)
RBC # FLD: 12.8 % — SIGNIFICANT CHANGE UP (ref 10.3–14.5)
RBC BLD AUTO: ABNORMAL
SODIUM SERPL-SCNC: 138 MMOL/L — SIGNIFICANT CHANGE UP (ref 135–145)
WBC # BLD: 12.62 K/UL — HIGH (ref 3.8–10.5)
WBC # FLD AUTO: 12.62 K/UL — HIGH (ref 3.8–10.5)

## 2024-05-23 PROCEDURE — 84295 ASSAY OF SERUM SODIUM: CPT

## 2024-05-23 PROCEDURE — 80048 BASIC METABOLIC PNL TOTAL CA: CPT

## 2024-05-23 PROCEDURE — C1889: CPT

## 2024-05-23 PROCEDURE — 86850 RBC ANTIBODY SCREEN: CPT

## 2024-05-23 PROCEDURE — 88305 TISSUE EXAM BY PATHOLOGIST: CPT

## 2024-05-23 PROCEDURE — C9399: CPT

## 2024-05-23 PROCEDURE — S2900: CPT

## 2024-05-23 PROCEDURE — 36415 COLL VENOUS BLD VENIPUNCTURE: CPT

## 2024-05-23 PROCEDURE — 84132 ASSAY OF SERUM POTASSIUM: CPT

## 2024-05-23 PROCEDURE — 85018 HEMOGLOBIN: CPT

## 2024-05-23 PROCEDURE — 82330 ASSAY OF CALCIUM: CPT

## 2024-05-23 PROCEDURE — 88307 TISSUE EXAM BY PATHOLOGIST: CPT

## 2024-05-23 PROCEDURE — 82947 ASSAY GLUCOSE BLOOD QUANT: CPT

## 2024-05-23 PROCEDURE — 86900 BLOOD TYPING SEROLOGIC ABO: CPT

## 2024-05-23 PROCEDURE — 85025 COMPLETE CBC W/AUTO DIFF WBC: CPT

## 2024-05-23 PROCEDURE — 86901 BLOOD TYPING SEROLOGIC RH(D): CPT

## 2024-05-23 PROCEDURE — 82962 GLUCOSE BLOOD TEST: CPT

## 2024-05-23 RX ORDER — DEXTROSE 50 % IN WATER 50 %
25 SYRINGE (ML) INTRAVENOUS ONCE
Refills: 0 | Status: DISCONTINUED | OUTPATIENT
Start: 2024-05-23 | End: 2024-05-23

## 2024-05-23 RX ORDER — SODIUM CHLORIDE 9 MG/ML
1000 INJECTION, SOLUTION INTRAVENOUS
Refills: 0 | Status: DISCONTINUED | OUTPATIENT
Start: 2024-05-23 | End: 2024-05-23

## 2024-05-23 RX ORDER — GLUCAGON INJECTION, SOLUTION 0.5 MG/.1ML
1 INJECTION, SOLUTION SUBCUTANEOUS ONCE
Refills: 0 | Status: DISCONTINUED | OUTPATIENT
Start: 2024-05-23 | End: 2024-05-23

## 2024-05-23 RX ORDER — DEXTROSE 50 % IN WATER 50 %
12.5 SYRINGE (ML) INTRAVENOUS ONCE
Refills: 0 | Status: DISCONTINUED | OUTPATIENT
Start: 2024-05-23 | End: 2024-05-23

## 2024-05-23 RX ORDER — DEXTROSE 50 % IN WATER 50 %
15 SYRINGE (ML) INTRAVENOUS ONCE
Refills: 0 | Status: DISCONTINUED | OUTPATIENT
Start: 2024-05-23 | End: 2024-05-23

## 2024-05-23 RX ORDER — CEFAZOLIN SODIUM 1 G
2000 VIAL (EA) INJECTION EVERY 8 HOURS
Refills: 0 | Status: DISCONTINUED | OUTPATIENT
Start: 2024-05-23 | End: 2024-05-23

## 2024-05-23 RX ORDER — INSULIN LISPRO 100/ML
VIAL (ML) SUBCUTANEOUS
Refills: 0 | Status: DISCONTINUED | OUTPATIENT
Start: 2024-05-23 | End: 2024-05-23

## 2024-05-23 RX ORDER — CEFAZOLIN SODIUM 1 G
2000 VIAL (EA) INJECTION EVERY 8 HOURS
Refills: 0 | Status: COMPLETED | OUTPATIENT
Start: 2024-05-23 | End: 2024-05-23

## 2024-05-23 RX ORDER — DEXTROSE 10 % IN WATER 10 %
125 INTRAVENOUS SOLUTION INTRAVENOUS ONCE
Refills: 0 | Status: DISCONTINUED | OUTPATIENT
Start: 2024-05-23 | End: 2024-05-23

## 2024-05-23 RX ADMIN — ENOXAPARIN SODIUM 40 MILLIGRAM(S): 100 INJECTION SUBCUTANEOUS at 06:31

## 2024-05-23 RX ADMIN — Medication 100 MILLIGRAM(S): at 14:23

## 2024-05-23 RX ADMIN — LIDOCAINE 1 PATCH: 4 CREAM TOPICAL at 16:04

## 2024-05-23 RX ADMIN — Medication 100 MILLIGRAM(S): at 06:30

## 2024-05-23 RX ADMIN — SODIUM CHLORIDE 150 MILLILITER(S): 9 INJECTION INTRAMUSCULAR; INTRAVENOUS; SUBCUTANEOUS at 06:30

## 2024-05-23 NOTE — PROGRESS NOTE ADULT - ASSESSMENT
Patient is a 48M w/ Right Renal Mass s/p Right Lap Partial Nephrectomy.  Patient had 2 episodes of nausea and vomiting post op, given Zofran, and Reglan, with improvement.  Patient VSS, HDS, and afebrile.    Plan:  -Diet: CLD  -Pain control  -Monitor Wong catheter output  -OOB/IS  -Abx: Ancef
47yo male with pre-diabetes, HLD, renal mass s/p robotic assisted partial right nephrectomy Pod#1

## 2024-05-23 NOTE — DISCHARGE NOTE NURSING/CASE MANAGEMENT/SOCIAL WORK - NSDCPEFALRISK_GEN_ALL_CORE
For information on Fall & Injury Prevention, visit: [email or web address] OR  [email or web address] OR  https://www.cdc.gov/steadi/patient.html

## 2024-05-23 NOTE — PROGRESS NOTE ADULT - SUBJECTIVE AND OBJECTIVE BOX
UROLOGY POST OP NOTE (PAGER # [phone])    PROCEDURE: Lap Partial Nephrectomy Right side     Patient alert and oriented x 3.  Patient appears to be distraught at bedside other vss, hds,     T(C): 36.6 (05-22-24 @ 17:28), Max: 36.8 (05-22-24 @ 14:17)  HR: 88 (05-22-24 @ 17:28) (82 - 97)  BP: 116/75 (05-22-24 @ 17:28) (104/60 - 128/82)  RR: 17 (05-22-24 @ 17:28) (14 - 20)  SpO2: 95% (05-22-24 @ 17:28) (89% - 99%)  Wt(kg): --    ON PE:    Abdomen: appropriately soft nt/nd.     : Wong catheter in place draining yellow/clear w/ blood tinge.               A/P:
INTERVAL HPI/OVERNIGHT EVENTS:  No acute events overnight. Yesterday evening patient ambulated. He states he passed flatus. Also admits to one episode of emesis but per family was "mostly air with small amount of spit up." Patient felt improved after without repeat episodes.     VITALS:    T(F): 98.3 (05-23-24 @ 04:46), Max: 98.4 (05-22-24 @ 20:15)  HR: 98 (05-23-24 @ 04:46) (82 - 98)  BP: 116/74 (05-23-24 @ 04:46) (104/60 - 128/82)  RR: 17 (05-23-24 @ 04:46) (14 - 20)  SpO2: 94% (05-23-24 @ 04:46) (89% - 99%)  Wt(kg): --    I&O's Detail    22 May 2024 07:01  -  23 May 2024 05:15  --------------------------------------------------------  IN:    sodium chloride 0.9%: 2100 mL  Total IN: 2100 mL    OUT:    Indwelling Catheter - Urethral (mL): 1425 mL  Total OUT: 1425 mL    Total NET: 675 mL          MEDICATIONS:    ANTIBIOTICS:      PAIN CONTROL:  acetaminophen     Tablet .. 975 milliGRAM(s) Oral every 6 hours  HYDROmorphone  Injectable 0.5 milliGRAM(s) IV Push once PRN  ondansetron Injectable 4 milliGRAM(s) IV Push every 6 hours PRN  oxyCODONE    IR 5 milliGRAM(s) Oral every 6 hours PRN       MEDS:      HEME/ONC  enoxaparin Injectable 40 milliGRAM(s) SubCutaneous every 12 hours        PHYSICAL EXAM:  General: No acute distress.  Alert and Oriented  Abdominal Exam: soft, appropriately tender, non-distended. incisions c/d/i   Exam: walden in place draining yellow urine

## 2024-05-23 NOTE — DISCHARGE NOTE NURSING/CASE MANAGEMENT/SOCIAL WORK - PATIENT PORTAL LINK FT
You can access the FollowMyHealth Patient Portal offered by [facility] by registering at the following website: [email or web address]. By joining [employer]’s FollowMyHealth portal, you will also be able to view your health information using other applications (apps) compatible with our system.

## 2024-05-23 NOTE — PROGRESS NOTE ADULT - PROBLEM SELECTOR PLAN 1
- s/p right partial nephrectomy   - diet: clears   - abx: Ancef   - DVT ppx: lovenox, SCDs   - ISS  - pain control   - f/u AM labs

## 2024-05-23 NOTE — DISCHARGE NOTE PROVIDER - NSDCFUADDINST_GEN_ALL_CORE_FT
Continue to advance diet as tolerated, you may shower, no strenuous activity or heavy lifting. Please call Dr Junior with fever>100.4, any questions and to set up your follow up appointment.

## 2024-05-23 NOTE — DISCHARGE NOTE PROVIDER - CARE PROVIDER_API CALL
Garrett Junior  Urology  [address], [address]  [place] [address]  Phone: [phone]  Fax: [phone]  Follow Up Time:

## 2024-05-23 NOTE — DISCHARGE NOTE PROVIDER - NSDCFUSCHEDAPPT_GEN_ALL_CORE_FT
[facility]  UROLOGY [address]  Scheduled Appointment: 08/01/2024    Chris Polo  [facility]  UROLOGY [address]  Scheduled Appointment: 08/01/2024

## 2024-05-23 NOTE — DISCHARGE NOTE PROVIDER - HOSPITAL COURSE
49 yo m with hx right renal mass underwent right partial nephrectomy tolerated procedure well, vss, afebrile, ambulatory, tolerating po diet, pain controlled, and hemodynamically stable.

## 2024-05-29 PROBLEM — E11.9 TYPE 2 DIABETES MELLITUS WITHOUT COMPLICATIONS: Chronic | Status: ACTIVE | Noted: 2024-05-21

## 2024-05-29 PROBLEM — Z86.39 PERSONAL HISTORY OF OTHER ENDOCRINE, NUTRITIONAL AND METABOLIC DISEASE: Chronic | Status: ACTIVE | Noted: 2024-05-21

## 2024-05-29 PROBLEM — I10 ESSENTIAL (PRIMARY) HYPERTENSION: Chronic | Status: ACTIVE | Noted: 2024-05-21

## 2024-05-29 NOTE — ED PROVIDER NOTE - MUSCULOSKELETAL [+], MLM
[facility]   HISTORY AND PHYSICAL      Name:  Adeel Gant Jr   Age:  79 y.o.  Sex:  male  :  1944  MRN:  [id number]   Visit Number:  [id number]  Dragon dictation.   BACK PAIN

## 2024-06-03 LAB — SURGICAL PATHOLOGY STUDY: SIGNIFICANT CHANGE UP

## 2024-06-05 DIAGNOSIS — C64.1 MALIGNANT NEOPLASM OF RIGHT KIDNEY, EXCEPT RENAL PELVIS: ICD-10-CM

## 2024-06-05 DIAGNOSIS — I10 ESSENTIAL (PRIMARY) HYPERTENSION: ICD-10-CM

## 2024-06-05 DIAGNOSIS — N28.89 OTHER SPECIFIED DISORDERS OF KIDNEY AND URETER: ICD-10-CM

## 2024-06-26 ENCOUNTER — APPOINTMENT (OUTPATIENT)
Dept: UROLOGY | Facility: CLINIC | Age: 49
End: 2024-06-26
Payer: COMMERCIAL

## 2024-06-26 ENCOUNTER — APPOINTMENT (OUTPATIENT)
Dept: UROLOGY | Facility: CLINIC | Age: 49
End: 2024-06-26

## 2024-06-26 VITALS
SYSTOLIC BLOOD PRESSURE: 150 MMHG | TEMPERATURE: 98.2 F | DIASTOLIC BLOOD PRESSURE: 87 MMHG | RESPIRATION RATE: 14 BRPM | HEIGHT: 70 IN | OXYGEN SATURATION: 99 % | HEART RATE: 84 BPM

## 2024-06-26 DIAGNOSIS — C64.1 MALIGNANT NEOPLASM OF RIGHT KIDNEY, EXCEPT RENAL PELVIS: ICD-10-CM

## 2024-06-26 PROCEDURE — 99024 POSTOP FOLLOW-UP VISIT: CPT

## 2024-08-01 ENCOUNTER — APPOINTMENT (OUTPATIENT)
Dept: UROLOGY | Facility: CLINIC | Age: 49
End: 2024-08-01

## 2024-12-09 ENCOUNTER — APPOINTMENT (OUTPATIENT)
Dept: ULTRASOUND IMAGING | Facility: CLINIC | Age: 49
End: 2024-12-09
Payer: COMMERCIAL

## 2024-12-09 PROCEDURE — 76770 US EXAM ABDO BACK WALL COMP: CPT

## 2024-12-20 ENCOUNTER — APPOINTMENT (OUTPATIENT)
Dept: UROLOGY | Facility: CLINIC | Age: 49
End: 2024-12-20
Payer: COMMERCIAL

## 2024-12-20 VITALS
TEMPERATURE: 98.7 F | HEART RATE: 85 BPM | BODY MASS INDEX: 39.08 KG/M2 | RESPIRATION RATE: 15 BRPM | WEIGHT: 273 LBS | OXYGEN SATURATION: 98 % | DIASTOLIC BLOOD PRESSURE: 104 MMHG | SYSTOLIC BLOOD PRESSURE: 146 MMHG | HEIGHT: 70 IN

## 2024-12-20 DIAGNOSIS — C64.1 MALIGNANT NEOPLASM OF RIGHT KIDNEY, EXCEPT RENAL PELVIS: ICD-10-CM

## 2024-12-20 PROCEDURE — 99214 OFFICE O/P EST MOD 30 MIN: CPT

## 2025-06-06 ENCOUNTER — APPOINTMENT (OUTPATIENT)
Dept: CT IMAGING | Facility: CLINIC | Age: 50
End: 2025-06-06

## 2025-06-06 PROCEDURE — 74170 CT ABD WO CNTRST FLWD CNTRST: CPT

## 2025-06-18 ENCOUNTER — NON-APPOINTMENT (OUTPATIENT)
Age: 50
End: 2025-06-18

## 2025-06-20 ENCOUNTER — APPOINTMENT (OUTPATIENT)
Dept: UROLOGY | Facility: CLINIC | Age: 50
End: 2025-06-20
Payer: COMMERCIAL

## 2025-06-20 VITALS
BODY MASS INDEX: 39.08 KG/M2 | OXYGEN SATURATION: 98 % | DIASTOLIC BLOOD PRESSURE: 96 MMHG | HEART RATE: 90 BPM | SYSTOLIC BLOOD PRESSURE: 152 MMHG | RESPIRATION RATE: 15 BRPM | HEIGHT: 70 IN | WEIGHT: 273 LBS | TEMPERATURE: 97.3 F

## 2025-06-20 PROCEDURE — 99214 OFFICE O/P EST MOD 30 MIN: CPT

## (undated) DEVICE — ENDOCATCH 10MM SPECIMEN POUCH

## (undated) DEVICE — ELCTR BOVIE PENCIL BLADE 10FT

## (undated) DEVICE — ENDOCATCH II 15MM

## (undated) DEVICE — XI CLIP APPLIER ARM MEDIUM-LARGE

## (undated) DEVICE — INSUFFLATION NDL COVIDIEN SURGINEEDLE VERESS 120MM

## (undated) DEVICE — XI VESSEL SEALER

## (undated) DEVICE — STAPLER COVIDIEN ENDO GIA STANDARD HANDLE

## (undated) DEVICE — Device

## (undated) DEVICE — DRAIN RESERVOIR FOR JACKSON PRATT 100CC CARDINAL

## (undated) DEVICE — SUT MONOCRYL 4-0 27" PS-2 UNDYED

## (undated) DEVICE — FOLEY TRAY 16FR 5CC LF UMETER CLOSED

## (undated) DEVICE — GLV 7.5 PROTEXIS (WHITE)

## (undated) DEVICE — LAP PAD 4 X 18"

## (undated) DEVICE — TUBING AIRSEAL TRI-LUMEN FILTERED

## (undated) DEVICE — DRAPE INSTRUMENT POUCH 10" X 18"

## (undated) DEVICE — INZII RETRIEVAL SYSTEM 12/15MM

## (undated) DEVICE — D HELP - CLEARVIEW CLEARIFY SYSTEM

## (undated) DEVICE — PROBE LAP ARGON BEAM 5MM

## (undated) DEVICE — XI SEAL UNIV 5- 8 MM

## (undated) DEVICE — SUT PROLENE 4-0 36" RB-1

## (undated) DEVICE — XI OBTURATOR OPTICAL BLADELESS 8MM

## (undated) DEVICE — DRAIN JACKSON PRATT 10MM FLAT 3/4 NO TROCAR

## (undated) DEVICE — SUT VLOC 180 2-0 9" GS-22 GREEN

## (undated) DEVICE — SUT CLIP LAPRA-TY ABSORBABLE SIZE 0.118 TO 0.12" VIOLET

## (undated) DEVICE — SUT VLOC 180 3-0 6" V-20 GREEN

## (undated) DEVICE — XI DRAPE COLUMN

## (undated) DEVICE — TROCAR SURGIQUEST AIRSEAL 12MMX100MM

## (undated) DEVICE — TIP METZENBAUM SCISSOR MONOPOLAR ENDOCUT (ORANGE)

## (undated) DEVICE — PACK GENERAL LAPAROSCOPY

## (undated) DEVICE — SUT VICRYL 0 27" UR-6

## (undated) DEVICE — XI DRAPE ARM

## (undated) DEVICE — DRSG DERMABOND 0.7ML

## (undated) DEVICE — XI TIP COVER